# Patient Record
Sex: MALE | Race: WHITE | NOT HISPANIC OR LATINO | Employment: FULL TIME | URBAN - METROPOLITAN AREA
[De-identification: names, ages, dates, MRNs, and addresses within clinical notes are randomized per-mention and may not be internally consistent; named-entity substitution may affect disease eponyms.]

---

## 2023-05-12 ENCOUNTER — TELEPHONE (OUTPATIENT)
Dept: PRIMARY CARE | Facility: CLINIC | Age: 63
End: 2023-05-12
Payer: COMMERCIAL

## 2023-05-12 DIAGNOSIS — J06.9 URI, ACUTE: Primary | ICD-10-CM

## 2023-05-12 RX ORDER — AMOXICILLIN AND CLAVULANATE POTASSIUM 875; 125 MG/1; MG/1
TABLET, FILM COATED ORAL
Qty: 20 TABLET | Refills: 0 | Status: SHIPPED | OUTPATIENT
Start: 2023-05-12 | End: 2024-02-28 | Stop reason: SDUPTHER

## 2024-02-28 ENCOUNTER — TELEPHONE (OUTPATIENT)
Dept: PRIMARY CARE | Facility: CLINIC | Age: 64
End: 2024-02-28
Payer: COMMERCIAL

## 2024-02-28 DIAGNOSIS — J06.9 URI, ACUTE: ICD-10-CM

## 2024-02-28 RX ORDER — AMOXICILLIN AND CLAVULANATE POTASSIUM 875; 125 MG/1; MG/1
TABLET, FILM COATED ORAL
Qty: 20 TABLET | Refills: 0 | Status: SHIPPED | OUTPATIENT
Start: 2024-02-28 | End: 2024-03-27

## 2024-02-28 NOTE — TELEPHONE ENCOUNTER
Pt called and states that he has an ear infection and would like to know if you could send in something. Pt states that he has water or something but no sinus issues. Pharmacy is on file and allergic to iodine

## 2024-03-27 DIAGNOSIS — J06.9 URI, ACUTE: ICD-10-CM

## 2024-03-27 PROBLEM — D17.1 LIPOMA OF CHEST WALL: Status: ACTIVE | Noted: 2024-03-27

## 2024-03-27 PROBLEM — I25.10 CORONARY ARTERY DISEASE INVOLVING NATIVE CORONARY ARTERY OF NATIVE HEART WITHOUT ANGINA PECTORIS: Status: ACTIVE | Noted: 2019-07-30

## 2024-03-27 PROBLEM — M72.0 PALMAR FASCIAL FIBROMATOSIS (DUPUYTREN): Status: ACTIVE | Noted: 2024-03-27

## 2024-03-27 RX ORDER — METOPROLOL TARTRATE 50 MG/1
50 TABLET ORAL 2 TIMES DAILY
COMMUNITY

## 2024-03-27 RX ORDER — AMOXICILLIN AND CLAVULANATE POTASSIUM 875; 125 MG/1; MG/1
TABLET, FILM COATED ORAL
Qty: 20 TABLET | Refills: 0 | Status: SHIPPED | OUTPATIENT
Start: 2024-03-27

## 2024-03-27 RX ORDER — ROSUVASTATIN CALCIUM 10 MG/1
10 TABLET, COATED ORAL NIGHTLY
COMMUNITY

## 2024-03-27 RX ORDER — PHENYLPROPANOLAMINE/CLEMASTINE 75-1.34MG
TABLET, EXTENDED RELEASE ORAL
COMMUNITY

## 2024-07-19 ENCOUNTER — OFFICE VISIT (OUTPATIENT)
Dept: PRIMARY CARE | Facility: CLINIC | Age: 64
End: 2024-07-19
Payer: COMMERCIAL

## 2024-07-19 VITALS
DIASTOLIC BLOOD PRESSURE: 70 MMHG | TEMPERATURE: 97.5 F | WEIGHT: 177.2 LBS | OXYGEN SATURATION: 95 % | BODY MASS INDEX: 25.43 KG/M2 | SYSTOLIC BLOOD PRESSURE: 150 MMHG | HEART RATE: 78 BPM

## 2024-07-19 DIAGNOSIS — K20.90 ESOPHAGITIS: ICD-10-CM

## 2024-07-19 DIAGNOSIS — R10.13 EPIGASTRIC PAIN: Primary | ICD-10-CM

## 2024-07-19 LAB
APPEARANCE UR: CLEAR
BILIRUB UR QL STRIP: ABNORMAL
COLOR UR: YELLOW
GLUCOSE UR STRIP-MCNC: NEGATIVE MG/DL
HGB UR QL STRIP: NEGATIVE
KETONES UR STRIP-MCNC: NEGATIVE MG/DL
LEUKOCYTE ESTERASE UR QL STRIP: NEGATIVE
NITRITE UR QL STRIP: NEGATIVE
PH UR STRIP: 5.5 [PH]
PROT UR STRIP-MCNC: NEGATIVE MG/DL
SP GR UR STRIP.AUTO: 1.02
UROBILINOGEN UR STRIP-ACNC: 0.2 E.U./DL

## 2024-07-19 PROCEDURE — 99214 OFFICE O/P EST MOD 30 MIN: CPT | Performed by: FAMILY MEDICINE

## 2024-07-19 PROCEDURE — 81003 URINALYSIS AUTO W/O SCOPE: CPT | Performed by: FAMILY MEDICINE

## 2024-07-19 RX ORDER — SUCRALFATE 1 G/1
1 TABLET ORAL
Qty: 120 TABLET | Refills: 0 | Status: SHIPPED | OUTPATIENT
Start: 2024-07-19 | End: 2024-08-18

## 2024-07-19 RX ORDER — OMEPRAZOLE 40 MG/1
40 CAPSULE, DELAYED RELEASE ORAL
Qty: 30 CAPSULE | Refills: 3 | Status: SHIPPED | OUTPATIENT
Start: 2024-07-19 | End: 2024-11-16

## 2024-07-19 ASSESSMENT — PAIN SCALES - GENERAL: PAINLEVEL: 7

## 2024-07-19 ASSESSMENT — ENCOUNTER SYMPTOMS: DEPRESSION: 0

## 2024-07-19 NOTE — PROGRESS NOTES
Subjective   Patient ID: Bill Walden is a 64 y.o. male who presents for Abdominal Pain (Patient is here for upper abdominal pain for 3 days.) and Nausea (Nausea for 1 day).    HPI   Epigastric pain since Tuesday.  Began slowly. Did not eat dinner.  Got severe by evening.  Nausea. No vomit.  Ate big meal yesterday. Mild bloat.  No BM changes.  Drinks ETOH and takes advil (High dose) regular.  Review of Systems    Objective   /70 (BP Location: Left arm, Patient Position: Sitting, BP Cuff Size: Adult)   Pulse 78   Temp 36.4 °C (97.5 °F) (Temporal)   Wt 80.4 kg (177 lb 3.2 oz)   SpO2 95%   BMI 25.43 kg/m²     Physical Exam  Alert, pleasant and in no acute distress.  Heart: Regular rate and rhythm without murmur  Lungs: Clear to auscultation  Lower extremities: No edema  Abd: Epigastric and RUQ tender.  No guard/rebound. Active Bowel Sounds    Assessment/Plan   Problem List Items Addressed This Visit    None  Visit Diagnoses         Codes    Epigastric pain    -  Primary R10.13    Relevant Medications    sucralfate (Carafate) 1 gram tablet    omeprazole (PriLOSEC) 40 mg DR capsule    Other Relevant Orders    POCT UA (Automated) docked device    CBC and Auto Differential    Hepatic function panel    Basic Metabolic Panel    Lipase    Referral to Gastroenterology    Esophagitis     K20.90        Patient with epigastric pain.  Likely esophagitis/ulcer.  Io UA trace bili.  Treat with omep and carafate.  To ER if any worsening.  Call me Monday with update.  CBC, cmp, lipase.  Stop all anti-inflam. Tylenol only  Sstop ETOH.  No better consider US gallbladder.  Refer to GI for stat consult

## 2024-07-29 ENCOUNTER — APPOINTMENT (OUTPATIENT)
Dept: PRIMARY CARE | Facility: CLINIC | Age: 64
End: 2024-07-29
Payer: COMMERCIAL

## 2024-07-29 VITALS
DIASTOLIC BLOOD PRESSURE: 58 MMHG | WEIGHT: 174.2 LBS | HEART RATE: 71 BPM | OXYGEN SATURATION: 96 % | BODY MASS INDEX: 25 KG/M2 | SYSTOLIC BLOOD PRESSURE: 108 MMHG

## 2024-07-29 DIAGNOSIS — M54.50 CHRONIC BILATERAL LOW BACK PAIN, UNSPECIFIED WHETHER SCIATICA PRESENT: Primary | ICD-10-CM

## 2024-07-29 DIAGNOSIS — G89.29 CHRONIC BILATERAL LOW BACK PAIN, UNSPECIFIED WHETHER SCIATICA PRESENT: Primary | ICD-10-CM

## 2024-07-29 PROCEDURE — 99213 OFFICE O/P EST LOW 20 MIN: CPT | Performed by: STUDENT IN AN ORGANIZED HEALTH CARE EDUCATION/TRAINING PROGRAM

## 2024-07-29 RX ORDER — MELOXICAM 15 MG/1
15 TABLET ORAL DAILY
Qty: 90 TABLET | Refills: 3 | Status: SHIPPED | OUTPATIENT
Start: 2024-07-29 | End: 2025-07-29

## 2024-07-29 ASSESSMENT — PAIN SCALES - GENERAL: PAINLEVEL: 5

## 2024-07-29 ASSESSMENT — ENCOUNTER SYMPTOMS: DEPRESSION: 0

## 2024-07-29 NOTE — PATIENT INSTRUCTIONS
1.  Chronic low back pain has been taking multiple ibuprofens for many years.  Advised on trial of Mobic 15 mg once per day.  Hopefully this will reduce the need for additional meds such as ibuprofen or Tylenol.  Would also advise on physical therapy to go and learn a good back and core strengthening routine that you can continue on your own for long-term maintenance and back pain prevention.    2.  Last week had episodes of abdominal issues concern for possible gastric ulcer due to excessive ibuprofen through the years.  He has established with GI and will have endoscopy next week.    Sammy Gan DO  for questions and f/u please call office   [address] [phone] [facility] [phone]  any forms needed please fax   [place] [phone] [facility] [phone]  for Physical therapy orders can call [phone]  for Radiology orders can call [phone]  for general referrals can call [id number]  for cardiac testing can call [phone]  for GI testing call [phone]

## 2024-07-29 NOTE — PROGRESS NOTES
Subjective   Patient ID: Bill Walden is a 64 y.o. male who presents for Back Pain.    HPI comes in for chronic low back pain    Review of Systems  Constitutional: NO F, chills, or sweats  Eyes: no blurred vision or visual disturbance  ENT: no hearing loss, no congestion, no nasal discharge, no hoarseness and no sore throat.   Cardiovascular: no chest pain, no edema, no palps and no syncope.   Respiratory: no cough,no s.o.b. and no wheezing  Gastrointestinal: no abdominal pain, No C/D no N/V, no blood in stools  Genitourinary: no dysuria, no change in urinary frequency, no urinary hesitancy and no feelings of urinary urgency.   Musculoskeletal: Chronic low back pain with intermittent flareups for many years  Objective   /58 (BP Location: Right arm, Patient Position: Sitting, BP Cuff Size: Adult)   Pulse 71   Wt 79 kg (174 lb 3.2 oz)   SpO2 96%   BMI 25.00 kg/m²     Physical Exam  gen- a & o x 3, nad, pleasant    Assessment/Plan     1.  Chronic low back pain has been taking multiple ibuprofens for many years.  Advised on trial of Mobic 15 mg once per day.  Hopefully this will reduce the need for additional meds such as ibuprofen or Tylenol.  Would also advise on physical therapy to go and learn a good back and core strengthening routine that you can continue on your own for long-term maintenance and back pain prevention.    2.  Last week had episodes of abdominal issues concern for possible gastric ulcer due to excessive ibuprofen through the years.  He has established with GI and will have endoscopy next week.

## 2024-10-09 DIAGNOSIS — R10.13 EPIGASTRIC PAIN: ICD-10-CM

## 2024-10-09 RX ORDER — OMEPRAZOLE 40 MG/1
40 CAPSULE, DELAYED RELEASE ORAL
Qty: 90 CAPSULE | Refills: 0 | Status: SHIPPED | OUTPATIENT
Start: 2024-10-09 | End: 2025-02-06

## 2025-01-18 DIAGNOSIS — R10.13 EPIGASTRIC PAIN: ICD-10-CM

## 2025-01-20 RX ORDER — OMEPRAZOLE 40 MG/1
CAPSULE, DELAYED RELEASE ORAL
Qty: 90 CAPSULE | Refills: 0 | Status: SHIPPED | OUTPATIENT
Start: 2025-01-20

## 2025-04-07 DIAGNOSIS — R10.13 EPIGASTRIC PAIN: ICD-10-CM

## 2025-04-07 RX ORDER — OMEPRAZOLE 40 MG/1
CAPSULE, DELAYED RELEASE ORAL
Qty: 90 CAPSULE | Refills: 0 | Status: SHIPPED | OUTPATIENT
Start: 2025-04-07

## 2025-07-13 DIAGNOSIS — R10.13 EPIGASTRIC PAIN: ICD-10-CM

## 2025-07-14 RX ORDER — OMEPRAZOLE 40 MG/1
CAPSULE, DELAYED RELEASE ORAL
Qty: 90 CAPSULE | Refills: 0 | Status: SHIPPED | OUTPATIENT
Start: 2025-07-14

## 2025-08-01 ENCOUNTER — TELEPHONE (OUTPATIENT)
Dept: PRIMARY CARE | Facility: CLINIC | Age: 65
End: 2025-08-01
Payer: COMMERCIAL

## 2025-08-01 DIAGNOSIS — G89.29 CHRONIC BILATERAL LOW BACK PAIN, UNSPECIFIED WHETHER SCIATICA PRESENT: ICD-10-CM

## 2025-08-01 DIAGNOSIS — M54.50 CHRONIC BILATERAL LOW BACK PAIN, UNSPECIFIED WHETHER SCIATICA PRESENT: ICD-10-CM

## 2025-08-01 RX ORDER — MELOXICAM 15 MG/1
15 TABLET ORAL DAILY
Qty: 90 TABLET | Refills: 1 | Status: SHIPPED | OUTPATIENT
Start: 2025-08-01 | End: 2026-08-01

## 2025-08-01 NOTE — TELEPHONE ENCOUNTER
Rx Refill Request Telephone Encounter    B[facility] PATIENT  Can you refill?       Name:  Bill Trujilloal  :  [id number]  Medication Name:  meloxicam (Mobic) 15 mg tablet   Specific Pharmacy location:  [place] [phone]  Date of last appointment:  2024  Date of next appointment:  NA  Best number to reach patient:  [phone]